# Patient Record
(demographics unavailable — no encounter records)

---

## 2025-06-11 NOTE — PLAN
[FreeTextEntry1] : annual: pap and hpv   likely PCOS never s/a was on accutaine so on pill at that time and that was only time period was regular lmp: about a month ago may had about 3 periods a yr d/w pt this is too infrequent d/w pt provera challenge *mom is [place]: may consider metformin? will need f/u after blood work

## 2025-06-11 NOTE — PHYSICAL EXAM
[Appropriately responsive] : appropriately responsive [Alert] : alert [No Acute Distress] : no acute distress [No Lymphadenopathy] : no lymphadenopathy [Regular Rate Rhythm] : regular rate rhythm [No Murmurs] : no murmurs [Clear to Auscultation B/L] : clear to auscultation bilaterally [Soft] : soft [Non-tender] : non-tender [No HSM] : No HSM [Non-distended] : non-distended [No Lesions] : no lesions [No Mass] : no mass [Oriented x3] : oriented x3 [Examination Of The Breasts] : a normal appearance [No Masses] : no breast masses were palpable [Labia Majora] : normal [Normal] :  normal [Labia Minora] : normal [FreeTextEntry4] : deferred full pelvic never s/a

## 2025-06-11 NOTE — HISTORY OF PRESENT ILLNESS
[N] : Patient denies prior pregnancies [No] : never pregnant [FreeTextEntry1] : Lizz Barron presents for irregular period. [TextBox_9] : 8th grade

## 2025-06-11 NOTE — REVIEW OF SYSTEMS
[Anxiety] : anxiety [Depression] : depression [de-identified] : had been on lexapro: doing lfine off